# Patient Record
Sex: FEMALE | Race: ASIAN | NOT HISPANIC OR LATINO | URBAN - METROPOLITAN AREA
[De-identification: names, ages, dates, MRNs, and addresses within clinical notes are randomized per-mention and may not be internally consistent; named-entity substitution may affect disease eponyms.]

---

## 2021-06-25 ENCOUNTER — INPATIENT (INPATIENT)
Facility: HOSPITAL | Age: 66
LOS: 0 days | Discharge: LEFT BEFORE DISCHARGE | DRG: 755 | End: 2021-06-25
Attending: STUDENT IN AN ORGANIZED HEALTH CARE EDUCATION/TRAINING PROGRAM | Admitting: STUDENT IN AN ORGANIZED HEALTH CARE EDUCATION/TRAINING PROGRAM
Payer: MEDICARE

## 2021-06-25 VITALS
WEIGHT: 93.92 LBS | RESPIRATION RATE: 16 BRPM | TEMPERATURE: 98 F | HEIGHT: 61.02 IN | SYSTOLIC BLOOD PRESSURE: 122 MMHG | HEART RATE: 114 BPM | OXYGEN SATURATION: 98 % | DIASTOLIC BLOOD PRESSURE: 72 MMHG

## 2021-06-25 VITALS — TEMPERATURE: 98 F | HEART RATE: 99 BPM

## 2021-06-25 DIAGNOSIS — D64.9 ANEMIA, UNSPECIFIED: ICD-10-CM

## 2021-06-25 LAB
ALBUMIN SERPL ELPH-MCNC: 2.6 G/DL — LOW (ref 3.3–5)
ALBUMIN SERPL ELPH-MCNC: 2.8 G/DL — LOW (ref 3.3–5)
ALP SERPL-CCNC: 243 U/L — HIGH (ref 40–120)
ALP SERPL-CCNC: 264 U/L — HIGH (ref 40–120)
ALT FLD-CCNC: 22 U/L — SIGNIFICANT CHANGE UP (ref 10–45)
ALT FLD-CCNC: 23 U/L — SIGNIFICANT CHANGE UP (ref 10–45)
ANION GAP SERPL CALC-SCNC: 12 MMOL/L — SIGNIFICANT CHANGE UP (ref 5–17)
ANION GAP SERPL CALC-SCNC: 14 MMOL/L — SIGNIFICANT CHANGE UP (ref 5–17)
AST SERPL-CCNC: 23 U/L — SIGNIFICANT CHANGE UP (ref 10–40)
AST SERPL-CCNC: 28 U/L — SIGNIFICANT CHANGE UP (ref 10–40)
BASE EXCESS BLDV CALC-SCNC: 6.9 MMOL/L — HIGH (ref -2–2)
BASOPHILS # BLD AUTO: 0.02 K/UL — SIGNIFICANT CHANGE UP (ref 0–0.2)
BASOPHILS # BLD AUTO: 0.02 K/UL — SIGNIFICANT CHANGE UP (ref 0–0.2)
BASOPHILS NFR BLD AUTO: 0.1 % — SIGNIFICANT CHANGE UP (ref 0–2)
BASOPHILS NFR BLD AUTO: 0.1 % — SIGNIFICANT CHANGE UP (ref 0–2)
BILIRUB SERPL-MCNC: 0.4 MG/DL — SIGNIFICANT CHANGE UP (ref 0.2–1.2)
BILIRUB SERPL-MCNC: 0.7 MG/DL — SIGNIFICANT CHANGE UP (ref 0.2–1.2)
BLD GP AB SCN SERPL QL: NEGATIVE — SIGNIFICANT CHANGE UP
BUN SERPL-MCNC: 11 MG/DL — SIGNIFICANT CHANGE UP (ref 7–23)
BUN SERPL-MCNC: 11 MG/DL — SIGNIFICANT CHANGE UP (ref 7–23)
CA-I SERPL-SCNC: 1.25 MMOL/L — SIGNIFICANT CHANGE UP (ref 1.12–1.3)
CALCIUM SERPL-MCNC: 9.6 MG/DL — SIGNIFICANT CHANGE UP (ref 8.4–10.5)
CALCIUM SERPL-MCNC: 9.8 MG/DL — SIGNIFICANT CHANGE UP (ref 8.4–10.5)
CHLORIDE BLDV-SCNC: 87 MMOL/L — LOW (ref 96–108)
CHLORIDE SERPL-SCNC: 85 MMOL/L — LOW (ref 96–108)
CHLORIDE SERPL-SCNC: 87 MMOL/L — LOW (ref 96–108)
CO2 BLDV-SCNC: 32 MMOL/L — HIGH (ref 22–30)
CO2 SERPL-SCNC: 26 MMOL/L — SIGNIFICANT CHANGE UP (ref 22–31)
CO2 SERPL-SCNC: 26 MMOL/L — SIGNIFICANT CHANGE UP (ref 22–31)
CREAT SERPL-MCNC: <0.3 MG/DL — LOW (ref 0.5–1.3)
CREAT SERPL-MCNC: <0.3 MG/DL — LOW (ref 0.5–1.3)
EOSINOPHIL # BLD AUTO: 0 K/UL — SIGNIFICANT CHANGE UP (ref 0–0.5)
EOSINOPHIL # BLD AUTO: 0.01 K/UL — SIGNIFICANT CHANGE UP (ref 0–0.5)
EOSINOPHIL NFR BLD AUTO: 0 % — SIGNIFICANT CHANGE UP (ref 0–6)
EOSINOPHIL NFR BLD AUTO: 0.1 % — SIGNIFICANT CHANGE UP (ref 0–6)
GAS PNL BLDV: 120 MMOL/L — CRITICAL LOW (ref 135–145)
GAS PNL BLDV: SIGNIFICANT CHANGE UP
GIANT PLATELETS BLD QL SMEAR: PRESENT — SIGNIFICANT CHANGE UP
GLUCOSE BLDV-MCNC: 184 MG/DL — HIGH (ref 70–99)
GLUCOSE SERPL-MCNC: 141 MG/DL — HIGH (ref 70–99)
GLUCOSE SERPL-MCNC: 178 MG/DL — HIGH (ref 70–99)
HCO3 BLDV-SCNC: 31 MMOL/L — HIGH (ref 21–29)
HCT VFR BLD CALC: 20.7 % — CRITICAL LOW (ref 34.5–45)
HCT VFR BLD CALC: 22.2 % — LOW (ref 34.5–45)
HCT VFR BLDA CALC: 24 % — LOW (ref 39–50)
HGB BLD CALC-MCNC: 7.6 G/DL — LOW (ref 11.5–15.5)
HGB BLD-MCNC: 6.2 G/DL — CRITICAL LOW (ref 11.5–15.5)
HGB BLD-MCNC: 7.2 G/DL — LOW (ref 11.5–15.5)
IMM GRANULOCYTES NFR BLD AUTO: 1 % — SIGNIFICANT CHANGE UP (ref 0–1.5)
IMM GRANULOCYTES NFR BLD AUTO: 1.4 % — SIGNIFICANT CHANGE UP (ref 0–1.5)
LACTATE BLDV-MCNC: 2.2 MMOL/L — HIGH (ref 0.7–2)
LG PLATELETS BLD QL AUTO: SLIGHT — SIGNIFICANT CHANGE UP
LYMPHOCYTES # BLD AUTO: 0.51 K/UL — LOW (ref 1–3.3)
LYMPHOCYTES # BLD AUTO: 0.72 K/UL — LOW (ref 1–3.3)
LYMPHOCYTES # BLD AUTO: 2.6 % — LOW (ref 13–44)
LYMPHOCYTES # BLD AUTO: 3.6 % — LOW (ref 13–44)
MANUAL SMEAR VERIFICATION: SIGNIFICANT CHANGE UP
MCHC RBC-ENTMCNC: 27.2 PG — SIGNIFICANT CHANGE UP (ref 27–34)
MCHC RBC-ENTMCNC: 28.1 PG — SIGNIFICANT CHANGE UP (ref 27–34)
MCHC RBC-ENTMCNC: 30 GM/DL — LOW (ref 32–36)
MCHC RBC-ENTMCNC: 32.4 GM/DL — SIGNIFICANT CHANGE UP (ref 32–36)
MCV RBC AUTO: 86.7 FL — SIGNIFICANT CHANGE UP (ref 80–100)
MCV RBC AUTO: 90.8 FL — SIGNIFICANT CHANGE UP (ref 80–100)
MONOCYTES # BLD AUTO: 1.1 K/UL — HIGH (ref 0–0.9)
MONOCYTES # BLD AUTO: 1.22 K/UL — HIGH (ref 0–0.9)
MONOCYTES NFR BLD AUTO: 5.6 % — SIGNIFICANT CHANGE UP (ref 2–14)
MONOCYTES NFR BLD AUTO: 6.1 % — SIGNIFICANT CHANGE UP (ref 2–14)
NEUTROPHILS # BLD AUTO: 17.79 K/UL — HIGH (ref 1.8–7.4)
NEUTROPHILS # BLD AUTO: 17.88 K/UL — HIGH (ref 1.8–7.4)
NEUTROPHILS NFR BLD AUTO: 89.2 % — HIGH (ref 43–77)
NEUTROPHILS NFR BLD AUTO: 90.2 % — HIGH (ref 43–77)
NRBC # BLD: 0 /100 WBCS — SIGNIFICANT CHANGE UP (ref 0–0)
NRBC # BLD: 0 /100 WBCS — SIGNIFICANT CHANGE UP (ref 0–0)
PCO2 BLDV: 44 MMHG — SIGNIFICANT CHANGE UP (ref 35–50)
PH BLDV: 7.46 — HIGH (ref 7.35–7.45)
PLAT MORPH BLD: ABNORMAL
PLATELET # BLD AUTO: 141 K/UL — LOW (ref 150–400)
PLATELET # BLD AUTO: 160 K/UL — SIGNIFICANT CHANGE UP (ref 150–400)
PO2 BLDV: 34 MMHG — SIGNIFICANT CHANGE UP (ref 25–45)
POTASSIUM BLDV-SCNC: 3.5 MMOL/L — SIGNIFICANT CHANGE UP (ref 3.5–5.3)
POTASSIUM SERPL-MCNC: 3.3 MMOL/L — LOW (ref 3.5–5.3)
POTASSIUM SERPL-MCNC: 3.7 MMOL/L — SIGNIFICANT CHANGE UP (ref 3.5–5.3)
POTASSIUM SERPL-SCNC: 3.3 MMOL/L — LOW (ref 3.5–5.3)
POTASSIUM SERPL-SCNC: 3.7 MMOL/L — SIGNIFICANT CHANGE UP (ref 3.5–5.3)
PROT SERPL-MCNC: 5.5 G/DL — LOW (ref 6–8.3)
PROT SERPL-MCNC: 5.9 G/DL — LOW (ref 6–8.3)
RBC # BLD: 2.28 M/UL — LOW (ref 3.8–5.2)
RBC # BLD: 2.56 M/UL — LOW (ref 3.8–5.2)
RBC # FLD: 15.5 % — HIGH (ref 10.3–14.5)
RBC # FLD: 17.4 % — HIGH (ref 10.3–14.5)
RBC BLD AUTO: SIGNIFICANT CHANGE UP
RH IG SCN BLD-IMP: POSITIVE — SIGNIFICANT CHANGE UP
SAO2 % BLDV: 66 % — LOW (ref 67–88)
SARS-COV-2 RNA SPEC QL NAA+PROBE: SIGNIFICANT CHANGE UP
SODIUM SERPL-SCNC: 125 MMOL/L — LOW (ref 135–145)
SODIUM SERPL-SCNC: 125 MMOL/L — LOW (ref 135–145)
WBC # BLD: 19.79 K/UL — HIGH (ref 3.8–10.5)
WBC # BLD: 19.94 K/UL — HIGH (ref 3.8–10.5)
WBC # FLD AUTO: 19.79 K/UL — HIGH (ref 3.8–10.5)
WBC # FLD AUTO: 19.94 K/UL — HIGH (ref 3.8–10.5)

## 2021-06-25 PROCEDURE — 99220: CPT | Mod: 25,GC

## 2021-06-25 PROCEDURE — 86900 BLOOD TYPING SEROLOGIC ABO: CPT

## 2021-06-25 PROCEDURE — 36430 TRANSFUSION BLD/BLD COMPNT: CPT

## 2021-06-25 PROCEDURE — 86078 PHYS BLOOD BANK SERV REACTJ: CPT

## 2021-06-25 PROCEDURE — 82803 BLOOD GASES ANY COMBINATION: CPT

## 2021-06-25 PROCEDURE — 85025 COMPLETE CBC W/AUTO DIFF WBC: CPT

## 2021-06-25 PROCEDURE — P9040: CPT

## 2021-06-25 PROCEDURE — 36000 PLACE NEEDLE IN VEIN: CPT | Mod: GC

## 2021-06-25 PROCEDURE — 93005 ELECTROCARDIOGRAM TRACING: CPT

## 2021-06-25 PROCEDURE — 85018 HEMOGLOBIN: CPT

## 2021-06-25 PROCEDURE — U0003: CPT

## 2021-06-25 PROCEDURE — 83605 ASSAY OF LACTIC ACID: CPT

## 2021-06-25 PROCEDURE — 82435 ASSAY OF BLOOD CHLORIDE: CPT

## 2021-06-25 PROCEDURE — 86923 COMPATIBILITY TEST ELECTRIC: CPT

## 2021-06-25 PROCEDURE — 84295 ASSAY OF SERUM SODIUM: CPT

## 2021-06-25 PROCEDURE — 84132 ASSAY OF SERUM POTASSIUM: CPT

## 2021-06-25 PROCEDURE — 82330 ASSAY OF CALCIUM: CPT

## 2021-06-25 PROCEDURE — 85014 HEMATOCRIT: CPT

## 2021-06-25 PROCEDURE — 99285 EMERGENCY DEPT VISIT HI MDM: CPT | Mod: 25

## 2021-06-25 PROCEDURE — 86901 BLOOD TYPING SEROLOGIC RH(D): CPT

## 2021-06-25 PROCEDURE — 82947 ASSAY GLUCOSE BLOOD QUANT: CPT

## 2021-06-25 PROCEDURE — 80053 COMPREHEN METABOLIC PANEL: CPT

## 2021-06-25 PROCEDURE — 87040 BLOOD CULTURE FOR BACTERIA: CPT

## 2021-06-25 PROCEDURE — 86850 RBC ANTIBODY SCREEN: CPT

## 2021-06-25 RX ORDER — CEFTRIAXONE 500 MG/1
1000 INJECTION, POWDER, FOR SOLUTION INTRAMUSCULAR; INTRAVENOUS ONCE
Refills: 0 | Status: COMPLETED | OUTPATIENT
Start: 2021-06-25 | End: 2021-06-25

## 2021-06-25 RX ORDER — AZITHROMYCIN 500 MG/1
1 TABLET, FILM COATED ORAL
Qty: 1 | Refills: 0
Start: 2021-06-25

## 2021-06-25 RX ORDER — POTASSIUM CHLORIDE 20 MEQ
40 PACKET (EA) ORAL ONCE
Refills: 0 | Status: COMPLETED | OUTPATIENT
Start: 2021-06-25 | End: 2021-06-25

## 2021-06-25 RX ORDER — CEFDINIR 250 MG/5ML
1 POWDER, FOR SUSPENSION ORAL
Qty: 14 | Refills: 0
Start: 2021-06-25 | End: 2021-07-01

## 2021-06-25 RX ORDER — ACETAMINOPHEN 500 MG
650 TABLET ORAL ONCE
Refills: 0 | Status: COMPLETED | OUTPATIENT
Start: 2021-06-25 | End: 2021-06-25

## 2021-06-25 RX ORDER — SODIUM CHLORIDE 9 MG/ML
250 INJECTION INTRAMUSCULAR; INTRAVENOUS; SUBCUTANEOUS ONCE
Refills: 0 | Status: COMPLETED | OUTPATIENT
Start: 2021-06-25 | End: 2021-06-25

## 2021-06-25 RX ADMIN — CEFTRIAXONE 100 MILLIGRAM(S): 500 INJECTION, POWDER, FOR SOLUTION INTRAMUSCULAR; INTRAVENOUS at 21:40

## 2021-06-25 RX ADMIN — Medication 650 MILLIGRAM(S): at 21:39

## 2021-06-25 RX ADMIN — Medication 650 MILLIGRAM(S): at 21:05

## 2021-06-25 RX ADMIN — SODIUM CHLORIDE 250 MILLILITER(S): 9 INJECTION INTRAMUSCULAR; INTRAVENOUS; SUBCUTANEOUS at 15:00

## 2021-06-25 RX ADMIN — Medication 40 MILLIEQUIVALENT(S): at 17:52

## 2021-06-25 NOTE — ED PROVIDER NOTE - PROGRESS NOTE DETAILS
denisha: spoke to pt regarding risks/benefits of not evaluating patient other than giving prbcs. 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

## 2021-06-25 NOTE — ED ADULT NURSE NOTE - TEMPLATE LIST FOR HEAD TO TOE ASSESSMENT
Spoke to Portia at [facility] and requesting clinical chart notes and orders from Dr. Dejesus. Portia was informed that we need a medical release to send documentation. Portia was given the fax # and will fax over the release.   VIEW ALL

## 2021-06-25 NOTE — ED CDU PROVIDER INITIAL DAY NOTE - PROGRESS NOTE DETAILS
CDU NOTE MILDRED Don: VSS NAD. Patient is resting comfortably and is without any complaints. Currently receiving blood transfusion Patient refused chest xray. Patient's PMD called Dr. Waters to discuss case. Explained that the ED recommends admission at this time. - Judith Israel PA-C Post blood transfusion patient with PO temp 100.8F, tachycardic, mildly tachypneic, with increased coughing. Blood transfusion reaction vs. underlying infection, although at this time likely infectious process. Blood transfusion paperwork filled out with RN. Tylenol given.  Patient mentating well. Will draw repeat blood work, obtain cultures, cxr, ua, and start Ceftriaxone. Will admit. REYNA Kc. - Judith Israel PA-C Patient seen at bedside. VSS.. Patient resting comfortably, no complaints.  Will reevaluate. - Judith Israel PA-C Patient would like to go home and not be admitted. States that she does not want to be in the hospital. Made aware that there is concern for infection at this time v. transfusion reaction, although likely infection. Will send patient home on antibiotics, although patient declined chest xray and did not provide UA.     The patient has decided to leave against medical advice.  The patient is AAOx3, not intoxicated, and displays normal decision making ability. We discussed all risks, benefits, and alternatives to the progression of treatment and the potential dangers of leaving including but not limited to permanent disability, injury, and death.  The patient was instructed that they are welcome to change their decision to leave against. Discussed with Dr. Acuna. - Judith Israel PA-C Patient would like to go home and not be admitted. States that she does not want to be in the hospital. Made aware that there is concern for infection at this time v. transfusion reaction, although likely infection. Will send patient home on antibiotics, although patient declined chest xray and did not provide UA.     The patient has decided to leave against medical advice.  The patient is AAOx3, not intoxicated, and displays normal decision making ability. We discussed all risks, benefits, and alternatives to the progression of treatment and the potential dangers of leaving including but not limited to permanent disability, injury, and death.  The patient was instructed that they are welcome to change their decision to leave against. Discussed with Dr. Acuna. Made hospitalist aware that patient would like to AMA.  - Judith Israel PA-C Post blood transfusion patient with PO temp 100.8F, tachycardic, mildly tachypneic, with increased coughing. Blood transfusion reaction vs. underlying infection, although at this time likely infectious process. Blood transfusion paperwork filled out with RN. Tylenol given.  Patient mentating well. Lung sounds clear. Will draw repeat blood work, obtain cultures, cxr, ua, and start Ceftriaxone. Will admit. Patient accepts admission. REYNA Kc. - Judith Israel PA-C

## 2021-06-25 NOTE — ED ADULT NURSE REASSESSMENT NOTE - COMFORT CARE
ambulated to bathroom/darkened lights/meal provided/plan of care explained/po fluids offered/repositioned/warm blanket provided

## 2021-06-25 NOTE — ED CDU PROVIDER INITIAL DAY NOTE - CROS ED CONS ALL NEG
Pt arrives to triage with c/o abdominal pain and heartburn x 1 hr. +nausea Pt reports PMH of same but states this is more intense.    negative...

## 2021-06-25 NOTE — ED CDU PROVIDER DISPOSITION NOTE - CLINICAL COURSE
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 Pt reports chronic worsening SOB w/ orthopnea, states deferring CA treatment for now and is on palliative care. 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     	Pt sent in to ED for 2 unit transfusion for anemia - noted after discussion w/ PCP's on call physician. Of note, pt is refusing any further intervention. States she will manage her other health issues outpatient. In CDU patient received 1 unit PRBC, PO temp 100.8F. Concern for infectious etiology, although blood transfusion reaction can be completely ruled out. Repeat blood work drawn, Tylenol and Ceftriaxone given. DW Dr. Acuna, will admit patient. Patient agrees to admission. 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 Pt reports chronic worsening SOB w/ orthopnea, states deferring CA treatment for now and is on palliative care. 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     	Pt sent in to ED for 2 unit transfusion for anemia - noted after discussion w/ PCP's on call physician. Of note, pt is refusing any further intervention. States she will manage her other health issues outpatient. In CDU patient received 1 unit PRBC, PO temp 100.8F. Concern for infectious etiology, although blood transfusion reaction can be completely ruled out. Repeat blood work drawn, Tylenol and Ceftriaxone given. REYNA Acuna, will admit patient. Patient agreed to admission and then stated that she would like to leave A. Patient to be sent home with antibiotics for coverage, although patient refused cxr and did not provide a urine sample. All questions answered. REYNA Acuna.

## 2021-06-25 NOTE — ED PROVIDER NOTE - ATTENDING CONTRIBUTION TO CARE
pt is a 65 y/o female with h/o ovarian Ca with mets to lungs, dvt with gff, no leg complaints, chronic sob due to mets, presents from blood transfusion with hg 6.2 here for blood transfusion, dc from gyn onc no chemo, hospice care being discussed only wants prbc and home, no cxr, gets na transfusions at pmd office for hyponatremia, labs sent, dicsuss with pmd.

## 2021-06-25 NOTE — ED CDU PROVIDER DISPOSITION NOTE - REFUSAL OF SERVICE, MDM
The patient has decided to leave against medical advice.  The patient is AAOx3, not intoxicated, and displays normal decision making ability. We discussed all risks, benefits, and alternatives to the progression of treatment and the potential dangers of leaving including but not limited to permanent disability, injury, and death.  The patient was instructed that they are welcome to change their decision to leave against.

## 2021-06-25 NOTE — ED PROVIDER NOTE - OBJECTIVE STATEMENT
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 Pt reports chronic worsening SOB w/ orthopnea, states deferring CA treatment for now and is on palliative care. 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     Pt sent in to ED for 2 unit transfusion for anemia - noted after discussion w/ PCP's on call physician. Of note, pt is refusing any further intervention. States she will manage her other health issues outpatient.

## 2021-06-25 NOTE — ED CDU PROVIDER INITIAL DAY NOTE - PMH
No pertinent past medical history
works in a [employer] . lives at home with her son  Denies smoking, illicit drug abuse or ALCohol abuse

## 2021-06-25 NOTE — ED CDU PROVIDER DISPOSITION NOTE - PATIENT PORTAL LINK FT
You can access the FollowMyHealth Patient Portal offered by [facility] by registering at the following website: [email or web address]. By joining [employer]’s FollowMyHealth portal, you will also be able to view your health information using other applications (apps) compatible with our system.

## 2021-06-25 NOTE — ED CDU PROVIDER DISPOSITION NOTE - NSFOLLOWUPINSTRUCTIONS_ED_ALL_ED_FT
Your diagnoses at this time was: Anemia, Fever    Hydrate.     Please take Tylenol 650mg every 6 hours as needed for fever.   Please also start taking Azithromycin and Cefdinir as prescribed, these medications are antibiotics and to cover you for infection.    You may be contacted by our Emergency Department Referrals Coordinator to set up your follow up appointment within 24-48 hours of your discharge Monday- Friday. We recommend you follow up with your primary care provider within the next 24 hours, please bring all of your results with you.     Please return to the Emergency Department with new, worsening, or concerning symptoms, such as:  -Fevers, vomiting, burning upon urination, back pain  -Shortness of breath or trouble breathing  -Pressure, pain, tightness in chest  -Facial drooping, arm weakness, or speech difficulty   -Head injury or loss of consciousness   -Nonstop bleeding or an open wound     *More detailed information regarding your visit and discharge can be found by reviewing this packet

## 2021-06-25 NOTE — ED CDU PROVIDER DISPOSITION NOTE - ATTENDING CONTRIBUTION TO CARE
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 patient was transfused one unit and developed a fever shortly after. patient noted to have elevated wbc. she refuses to stay in the hospital and wants to leave against medical advice.   Gen.  no acute distress  HEENT:  perrl eomi  Lungs:  b/l air entry  CVS: S1S2   Abd; soft non tender no distention    Ext: no erythema,   Neuro: aaox3   MSK: moving all ext spon   The patient has decided to leave against medical advice.  The patient is AAOx3, not intoxicated, and displays normal decision making ability. We discussed all risks, benefits, and alternatives to the progression of treatment and the potential dangers of leaving including but not limited to permanent disability, injury, and death.  The patient was instructed that they are welcome to change their decision to leave against medical advice and return to the emergency department at any time and for any reason in order to allow us to render care. I explained that she did not receive the second unit because of concern of reaction. in addition without any imaging or urine test, we can not evaluate and determine the cause of her fever. we will offer her abx and close follow up as outpt with return precautions.

## 2021-06-25 NOTE — ED CDU PROVIDER DISPOSITION NOTE - TRANSFER CONDITION
[address]    PATIENT'S NAME: Wilfredo Freitas   ATTENDING PHYSICIAN: AMBER Yañez Prior: Ishmael Krabbe, MD   PATIENT ACCOUNT#:   [de-identified]    LOCATION:  [address] [place] [address]  MEDICAL RECORD #:   [id number]       DATE OF BIRTH: h Stable

## 2021-06-25 NOTE — ED ADULT NURSE NOTE - OBJECTIVE STATEMENT
66 year old female presents to the ED for shortness of breath and low hemoglobin.  Patient has been short of breath and had lower extremity for several months not acutely worsening.  She had blood work done outpatient and was called last night to advise her to go to the ED for low hemoglobin.  She is on lasix since April and taking it as prescribed with no issues urinating.  She denies: fevers, chills, abdominal pain, hematuria, blood in stool, vaginal bleeding, sick contacts. 66 year old female PMH of ovarian ca with mets to the lung not surrently on treatment presents to the ED for shortness of breath and low hemoglobin.  Patient has been short of breath and had lower extremity for several months not acutely worsening.  She had blood work done outpatient and was called last night to advise her to go to the ED for low hemoglobin.  She is on lasix since April and taking it as prescribed with no issues urinating.  She denies: fevers, chills, abdominal pain, hematuria, blood in stool, vaginal bleeding, sick contacts. 66 year old female PMH of ovarian ca with mets to the lung not currently on treatment presents to the ED for shortness of breath and low hemoglobin.  Patient has been short of breath and had lower extremity edema for several months not acutely worsening.  She had blood work done outpatient and was called last night to advise her to go to the ED for low hemoglobin.  She is on lasix since April and taking it as prescribed with no issues urinating.  She denies: fevers, chills, abdominal pain, hematuria, blood in stool, vaginal bleeding, sick contacts.

## 2021-07-01 LAB
CULTURE RESULTS: SIGNIFICANT CHANGE UP
CULTURE RESULTS: SIGNIFICANT CHANGE UP
SPECIMEN SOURCE: SIGNIFICANT CHANGE UP
SPECIMEN SOURCE: SIGNIFICANT CHANGE UP

## 2023-08-26 NOTE — ED ADULT NURSE REASSESSMENT NOTE - NS ED NURSE REASSESS COMMENT FT1
Pt report received from Dottie KUHN. Pt transferred to cdu Red 44 for 2 units of PRBC due to low h/h due to Ca w/ mets. Pt a/ox3 denies respiratory distress, sob, dyspnea, C/P, palpitations, n/v/d at this time. Pt states symptoms have improved.  currently awaiting at this time. VSS, afebrile, IV clean/dry/intact. Pt aware of plan of care, call bell within reach ,safety maintained. Will monitor and assess.
Pt signing out AMA, pt was made aware of risks of signing out AMA by MILDRED Wong and MD Vasquez. Pt removed own IV access and Port access, pt took herself off telemetry monitor. Ambulated out of unit with spouse. Left hospital via private car.
Right chest wall port accessed using sterile technique.  Patient tolerated procedure well, flushed easily but no blood return present.  250mL 0.9% bolus given per Dr. Perla and no signs of extravasation noted, no pain at site.
Temp at 100.8, hr: 112,  MILDRED barron, 2nd unit PRBC held at this time, 650mg PO Tylenol. Pending additional orders.
Received pt from HATTIE Pang , received pt alert and responsive, oriented x4, denies any respiratory distress, SOB, or difficulty breathing. Pt transferred to CDU for 2unit PRBC tolerating well.  IV in place, patent and free of signs of infiltration, on continuous cardiac monitoring.  pt denies chest pain or palpitations, V/S stable, pt afebrile, pt denies pain at this time. Pt educated on unit and unit rules, instructed patient to notify RN of any needed assistance, Pt verbalizes understanding, Call bell placed within reach. Safety maintained. Will continue to monitor. Spouse at bedside.
ED MD